# Patient Record
(demographics unavailable — no encounter records)

---

## 2025-01-02 NOTE — PHYSICAL EXAM
[General Appearance - Alert] : alert [Sclera] : the sclera and conjunctiva were normal [Outer Ear] : the ears and nose were normal in appearance [] : no respiratory distress [Heart Rate And Rhythm] : heart rate was normal and rhythm regular [Edema] : there was no peripheral edema [No Palpable Adenopathy] : no palpable adenopathy [Musculoskeletal - Swelling] : no joint swelling [Motor Exam] : the motor exam was normal [Oriented To Time, Place, And Person] : oriented to person, place, and time [FreeTextEntry1] : left 4th finger s/p nail removal; mild surrounding erythema and swelling, non-tender, no purulence

## 2025-01-02 NOTE — HISTORY OF PRESENT ILLNESS
[FreeTextEntry1] : 33 year old female, 38 weeks pregnant, here for evaluation of paronychia of 4th finger on left hand.  Reports pain, swelling, erythema to left 4th finger.   Tried treatment with chlorhexidine soaks which did not help.  Then did a trial of keflex.  Was seen in [facility] ER on 12/24 where an I&D was attempted but no fluid obtained.  She was given Linezolid 600 mg PO q12 which helped the erythema and swelling but there was some fluid remaining underneath the nail bed.  She then had an I&D done in [facility] ER on 12/30/24 where the finger nail was removed.  No pus was drained but fluid did appear infected.  She was given another week of linezolid.  She reports that the finger was very painful and swollen after the nail removal but has begun to calm down.